# Patient Record
Sex: FEMALE | Race: WHITE | Employment: OTHER | URBAN - METROPOLITAN AREA
[De-identification: names, ages, dates, MRNs, and addresses within clinical notes are randomized per-mention and may not be internally consistent; named-entity substitution may affect disease eponyms.]

---

## 2019-02-12 ENCOUNTER — ANESTHESIA (OUTPATIENT)
Dept: SURGERY | Age: 69
End: 2019-02-12
Payer: MEDICARE

## 2019-02-12 ENCOUNTER — HOSPITAL ENCOUNTER (OUTPATIENT)
Age: 69
Setting detail: OUTPATIENT SURGERY
Discharge: HOME | End: 2019-02-12
Attending: OPHTHALMOLOGY | Admitting: OPHTHALMOLOGY
Payer: MEDICARE

## 2019-02-12 ENCOUNTER — ANESTHESIA EVENT (OUTPATIENT)
Dept: SURGERY | Age: 69
End: 2019-02-12
Payer: MEDICARE

## 2019-02-12 VITALS
RESPIRATION RATE: 12 BRPM | WEIGHT: 139.56 LBS | HEIGHT: 61 IN | BODY MASS INDEX: 26.35 KG/M2 | SYSTOLIC BLOOD PRESSURE: 150 MMHG | OXYGEN SATURATION: 99 % | HEART RATE: 75 BPM | DIASTOLIC BLOOD PRESSURE: 81 MMHG | TEMPERATURE: 97.3 F

## 2019-02-12 PROBLEM — H53.8 BLURRED VISION: Status: ACTIVE | Noted: 2019-02-12

## 2019-02-12 PROBLEM — H53.8 BLURRED VISION: Status: RESOLVED | Noted: 2019-02-12 | Resolved: 2019-02-12

## 2019-02-12 PROCEDURE — 76060000032 HC ANESTHESIA 0.5 TO 1 HR: Performed by: OPHTHALMOLOGY

## 2019-02-12 PROCEDURE — 77030013389: Performed by: OPHTHALMOLOGY

## 2019-02-12 PROCEDURE — 74011000250 HC RX REV CODE- 250: Performed by: OPHTHALMOLOGY

## 2019-02-12 PROCEDURE — 76210000021 HC REC RM PH II 0.5 TO 1 HR: Performed by: OPHTHALMOLOGY

## 2019-02-12 PROCEDURE — 76010000138 HC OR TIME 0.5 TO 1 HR: Performed by: OPHTHALMOLOGY

## 2019-02-12 PROCEDURE — V2630 ANTER CHAMBER INTRAOCUL LENS: HCPCS | Performed by: OPHTHALMOLOGY

## 2019-02-12 PROCEDURE — 74011250636 HC RX REV CODE- 250/636: Performed by: OPHTHALMOLOGY

## 2019-02-12 PROCEDURE — 74011250636 HC RX REV CODE- 250/636

## 2019-02-12 PROCEDURE — V2787 ASTIGMATISM-CORRECT FUNCTION: HCPCS | Performed by: OPHTHALMOLOGY

## 2019-02-12 PROCEDURE — 77030032490 HC SLV COMPR SCD KNE COVD -B: Performed by: OPHTHALMOLOGY

## 2019-02-12 PROCEDURE — 77030013340: Performed by: OPHTHALMOLOGY

## 2019-02-12 DEVICE — IMPLANTABLE DEVICE: Type: IMPLANTABLE DEVICE | Site: EYE | Status: FUNCTIONAL

## 2019-02-12 RX ORDER — PROPARACAINE HYDROCHLORIDE 5 MG/ML
SOLUTION/ DROPS OPHTHALMIC AS NEEDED
Status: DISCONTINUED | OUTPATIENT
Start: 2019-02-12 | End: 2019-02-12 | Stop reason: HOSPADM

## 2019-02-12 RX ORDER — SODIUM CHLORIDE, SODIUM LACTATE, POTASSIUM CHLORIDE, CALCIUM CHLORIDE 600; 310; 30; 20 MG/100ML; MG/100ML; MG/100ML; MG/100ML
75 INJECTION, SOLUTION INTRAVENOUS CONTINUOUS
Status: DISCONTINUED | OUTPATIENT
Start: 2019-02-12 | End: 2019-02-12 | Stop reason: HOSPADM

## 2019-02-12 RX ORDER — EPINEPHRINE 1 MG/ML
INJECTION, SOLUTION, CONCENTRATE INTRAVENOUS AS NEEDED
Status: DISCONTINUED | OUTPATIENT
Start: 2019-02-12 | End: 2019-02-12 | Stop reason: HOSPADM

## 2019-02-12 RX ORDER — DEXTROSE 50 % IN WATER (D50W) INTRAVENOUS SYRINGE
25-50 AS NEEDED
Status: CANCELLED | OUTPATIENT
Start: 2019-02-12

## 2019-02-12 RX ORDER — TROPICAMIDE 10 MG/ML
1 SOLUTION/ DROPS OPHTHALMIC
Status: COMPLETED | OUTPATIENT
Start: 2019-02-12 | End: 2019-02-12

## 2019-02-12 RX ORDER — OFLOXACIN 3 MG/ML
1 SOLUTION/ DROPS OPHTHALMIC ONCE
Status: COMPLETED | OUTPATIENT
Start: 2019-02-12 | End: 2019-02-12

## 2019-02-12 RX ORDER — PHENYLEPHRINE HYDROCHLORIDE 25 MG/ML
1 SOLUTION/ DROPS OPHTHALMIC ONCE
Status: COMPLETED | OUTPATIENT
Start: 2019-02-12 | End: 2019-02-12

## 2019-02-12 RX ORDER — ALBUTEROL SULFATE 0.83 MG/ML
2.5 SOLUTION RESPIRATORY (INHALATION) AS NEEDED
Status: CANCELLED | OUTPATIENT
Start: 2019-02-12

## 2019-02-12 RX ORDER — OXYCODONE HYDROCHLORIDE 5 MG/1
5 TABLET ORAL ONCE
Status: CANCELLED | OUTPATIENT
Start: 2019-02-12 | End: 2019-02-12

## 2019-02-12 RX ORDER — KETOROLAC TROMETHAMINE 5 MG/ML
1 SOLUTION OPHTHALMIC
Status: COMPLETED | OUTPATIENT
Start: 2019-02-12 | End: 2019-02-12

## 2019-02-12 RX ORDER — NALOXONE HYDROCHLORIDE 0.4 MG/ML
0.1 INJECTION, SOLUTION INTRAMUSCULAR; INTRAVENOUS; SUBCUTANEOUS AS NEEDED
Status: CANCELLED | OUTPATIENT
Start: 2019-02-12

## 2019-02-12 RX ORDER — DIPHENHYDRAMINE HYDROCHLORIDE 50 MG/ML
12.5 INJECTION, SOLUTION INTRAMUSCULAR; INTRAVENOUS
Status: CANCELLED | OUTPATIENT
Start: 2019-02-12

## 2019-02-12 RX ORDER — MIDAZOLAM HYDROCHLORIDE 1 MG/ML
INJECTION, SOLUTION INTRAMUSCULAR; INTRAVENOUS AS NEEDED
Status: DISCONTINUED | OUTPATIENT
Start: 2019-02-12 | End: 2019-02-12 | Stop reason: HOSPADM

## 2019-02-12 RX ORDER — ONDANSETRON 2 MG/ML
4 INJECTION INTRAMUSCULAR; INTRAVENOUS ONCE
Status: CANCELLED | OUTPATIENT
Start: 2019-02-12 | End: 2019-02-12

## 2019-02-12 RX ORDER — SODIUM CHLORIDE 0.9 % (FLUSH) 0.9 %
5-40 SYRINGE (ML) INJECTION AS NEEDED
Status: CANCELLED | OUTPATIENT
Start: 2019-02-12

## 2019-02-12 RX ORDER — SODIUM CHLORIDE, SODIUM LACTATE, POTASSIUM CHLORIDE, CALCIUM CHLORIDE 600; 310; 30; 20 MG/100ML; MG/100ML; MG/100ML; MG/100ML
150 INJECTION, SOLUTION INTRAVENOUS CONTINUOUS
Status: CANCELLED | OUTPATIENT
Start: 2019-02-12

## 2019-02-12 RX ORDER — MAGNESIUM SULFATE 100 %
4 CRYSTALS MISCELLANEOUS AS NEEDED
Status: CANCELLED | OUTPATIENT
Start: 2019-02-12

## 2019-02-12 RX ORDER — FENTANYL CITRATE 50 UG/ML
INJECTION, SOLUTION INTRAMUSCULAR; INTRAVENOUS AS NEEDED
Status: DISCONTINUED | OUTPATIENT
Start: 2019-02-12 | End: 2019-02-12 | Stop reason: HOSPADM

## 2019-02-12 RX ORDER — SODIUM CHLORIDE 0.9 % (FLUSH) 0.9 %
5-40 SYRINGE (ML) INJECTION EVERY 8 HOURS
Status: CANCELLED | OUTPATIENT
Start: 2019-02-12

## 2019-02-12 RX ADMIN — TROPICAMIDE 1 DROP: 10 SOLUTION/ DROPS OPHTHALMIC at 09:03

## 2019-02-12 RX ADMIN — TROPICAMIDE 1 DROP: 10 SOLUTION/ DROPS OPHTHALMIC at 09:13

## 2019-02-12 RX ADMIN — FENTANYL CITRATE 25 MCG: 50 INJECTION, SOLUTION INTRAMUSCULAR; INTRAVENOUS at 10:11

## 2019-02-12 RX ADMIN — KETOROLAC TROMETHAMINE 1 DROP: 5 SOLUTION OPHTHALMIC at 08:56

## 2019-02-12 RX ADMIN — KETOROLAC TROMETHAMINE 1 DROP: 5 SOLUTION OPHTHALMIC at 09:13

## 2019-02-12 RX ADMIN — FENTANYL CITRATE 25 MCG: 50 INJECTION, SOLUTION INTRAMUSCULAR; INTRAVENOUS at 10:15

## 2019-02-12 RX ADMIN — TROPICAMIDE 1 DROP: 10 SOLUTION/ DROPS OPHTHALMIC at 08:46

## 2019-02-12 RX ADMIN — PHENYLEPHRINE HYDROCHLORIDE 1 DROP: 2.5 SOLUTION/ DROPS OPHTHALMIC at 08:45

## 2019-02-12 RX ADMIN — MIDAZOLAM HYDROCHLORIDE 1 MG: 1 INJECTION, SOLUTION INTRAMUSCULAR; INTRAVENOUS at 10:10

## 2019-02-12 RX ADMIN — SODIUM CHLORIDE, SODIUM LACTATE, POTASSIUM CHLORIDE, AND CALCIUM CHLORIDE: 600; 310; 30; 20 INJECTION, SOLUTION INTRAVENOUS at 10:07

## 2019-02-12 RX ADMIN — TROPICAMIDE 1 DROP: 10 SOLUTION/ DROPS OPHTHALMIC at 09:18

## 2019-02-12 RX ADMIN — KETOROLAC TROMETHAMINE 1 DROP: 5 SOLUTION OPHTHALMIC at 09:03

## 2019-02-12 RX ADMIN — OFLOXACIN 1 DROP: 3 SOLUTION/ DROPS OPHTHALMIC at 09:17

## 2019-02-12 RX ADMIN — MIDAZOLAM HYDROCHLORIDE 1 MG: 1 INJECTION, SOLUTION INTRAMUSCULAR; INTRAVENOUS at 10:18

## 2019-02-12 RX ADMIN — KETOROLAC TROMETHAMINE 1 DROP: 5 SOLUTION OPHTHALMIC at 09:18

## 2019-02-12 RX ADMIN — SODIUM CHLORIDE, SODIUM LACTATE, POTASSIUM CHLORIDE, AND CALCIUM CHLORIDE 75 ML/HR: 600; 310; 30; 20 INJECTION, SOLUTION INTRAVENOUS at 09:03

## 2019-02-12 RX ADMIN — KETOROLAC TROMETHAMINE 1 DROP: 5 SOLUTION OPHTHALMIC at 08:49

## 2019-02-12 RX ADMIN — TROPICAMIDE 1 DROP: 10 SOLUTION/ DROPS OPHTHALMIC at 08:56

## 2019-02-12 RX ADMIN — FENTANYL CITRATE 50 MCG: 50 INJECTION, SOLUTION INTRAMUSCULAR; INTRAVENOUS at 10:22

## 2019-02-12 NOTE — PERIOP NOTES
PATIENT SUPPLIED POST-OP DROPS: KETOROLAC, OFLOXACIN, PREDNISOLONE 1 GTT EACH left EYE AT END OF CASE. SUNGLASSES APPLIED AT END OF CASE.

## 2019-02-12 NOTE — ANESTHESIA PREPROCEDURE EVALUATION
Anesthetic History No history of anesthetic complications Review of Systems / Medical History Patient summary reviewed, nursing notes reviewed and pertinent labs reviewed Pulmonary Within defined limits Neuro/Psych Within defined limits Cardiovascular Exercise tolerance: >4 METS 
  
GI/Hepatic/Renal 
Within defined limits Endo/Other Arthritis Other Findings Anesthetic Plan ASA: 2 Anesthesia type: MAC Anesthetic plan and risks discussed with: Patient

## 2019-02-12 NOTE — ANESTHESIA POSTPROCEDURE EVALUATION
Procedure(s): CATARACT EXTRACTION WITH INTRA OCULAR LENS IMPLANT LEFT EYE. Anesthesia Post Evaluation Multimodal analgesia: multimodal analgesia used between 6 hours prior to anesthesia start to PACU discharge Patient location during evaluation: PACU Patient participation: complete - patient participated Level of consciousness: awake Pain management: adequate Airway patency: patent Anesthetic complications: no 
Cardiovascular status: acceptable Respiratory status: acceptable Hydration status: acceptable Post anesthesia nausea and vomiting:  none Visit Vitals /69 Pulse 86 Temp 36.2 °C (97.1 °F) Resp (!) 7 Ht 5' 1\" (1.549 m) Wt 63.3 kg (139 lb 9 oz) SpO2 99% BMI 26.37 kg/m²

## 2019-02-12 NOTE — INTERVAL H&P NOTE
H&P Update: 
Constance Chandler was seen and examined. History and physical has been reviewed. The patient has been examined.  There have been no significant clinical changes since the completion of the originally dated History and Physical. 
 
Signed By: Michelle Stallworth MD   
 February 12, 2019 7:48 AM

## 2019-02-12 NOTE — DISCHARGE INSTRUCTIONS
Patient Education     DISCHARGE SUMMARY from Nurse    PATIENT INSTRUCTIONS:    After general anesthesia or intravenous sedation, for 24 hours or while taking prescription Narcotics:  · Limit your activities  · Do not drive and operate hazardous machinery  · Do not make important personal or business decisions  · Do  not drink alcoholic beverages  · If you have not urinated within 8 hours after discharge, please contact your surgeon on call. Report the following to your surgeon:  · Excessive pain, swelling, redness or odor of or around the surgical area  · Temperature over 100.5  · Nausea and vomiting lasting longer than 4 hours or if unable to take medications  · Any signs of decreased circulation or nerve impairment to extremity: change in color, persistent  numbness, tingling, coldness or increase pain  · Any questions    What to do at Home:  [place] TO OFFICE ON Wednesday AS SCHEDULED    If you experience any of the following symptoms bleeding, nausea, severe pain, please follow up with dr Lori Wells    *  Please give a list of your current medications to your Primary Care Provider. *  Please update this list whenever your medications are discontinued, doses are      changed, or new medications (including over-the-counter products) are added. *  Please carry medication information at all times in case of emergency situations. These are general instructions for a healthy lifestyle:    No smoking/ No tobacco products/ Avoid exposure to second hand smoke  Surgeon General's Warning:  Quitting smoking now greatly reduces serious risk to your health.     Obesity, smoking, and sedentary lifestyle greatly increases your risk for illness    A healthy diet, regular physical exercise & weight monitoring are important for maintaining a healthy lifestyle    You may be retaining fluid if you have a history of heart failure or if you experience any of the following symptoms:  Weight gain of 3 pounds or more overnight or 5 pounds in a week, increased swelling in our hands or feet or shortness of breath while lying flat in bed. Please call your doctor as soon as you notice any of these symptoms; do not wait until your next office visit. Recognize signs and symptoms of STROKE:    F-face looks uneven    A-arms unable to move or move unevenly    S-speech slurred or non-existent    T-time-call 911 as soon as signs and symptoms begin-DO NOT go       Back to bed or wait to see if you get better-TIME IS BRAIN. Warning Signs of HEART ATTACK     Call 911 if you have these symptoms:   Chest discomfort. Most heart attacks involve discomfort in the center of the chest that lasts more than a few minutes, or that goes away and comes back. It can feel like uncomfortable pressure, squeezing, fullness, or pain.  Discomfort in other areas of the upper body. Symptoms can include pain or discomfort in one or both arms, the back, neck, jaw, or stomach.  Shortness of breath with or without chest discomfort.  Other signs may include breaking out in a cold sweat, nausea, or lightheadedness. Don't wait more than five minutes to call 911 - MINUTES MATTER! Fast action can save your life. Calling 911 is almost always the fastest way to get lifesaving treatment. Emergency Medical Services staff can begin treatment when they arrive -- up to an hour sooner than if someone gets to the hospital by car. The discharge information has been reviewed with the patient and caregiver. The patient and caregiver verbalized understanding. Discharge medications reviewed with the patient and caregiver and appropriate educational materials and side effects teaching were provided.   ___________________________________________________________________________________________________________________________________     Cataract Surgery: What to Expect at Home  Your Recovery    After surgery, your eye will not hurt. But it may feel scratchy, sticky, or uncomfortable. It may also water more than usual.  Most people see better 1 to 3 days after surgery. But it could take 3 to 10 weeks to get the full benefits of surgery and to see as clearly as possible. Your doctor may send you home with a bandage, patch, or clear shield on your eye. This will keep you from rubbing your eye. Your doctor will also give you eyedrops to help your eye heal. Use them exactly as directed. You can read or watch TV right away, but things may look blurry. Most people are able to return to work or their normal routine in 1 to 3 days. After your eye heals, you may still need to wear glasses, especially for reading. This care sheet gives you a general idea about how long it will take for you to recover. But each person recovers at a different pace. Follow the steps below to get better as quickly as possible. How can you care for yourself at home? Activity    · Rest when you feel tired. Getting enough sleep will help you recover.     · You may have trouble judging distances for a few days. Move slowly, and be careful going up and down stairs and pouring hot liquids. Ask for help if you need it.     · Ask your doctor when it is okay to drive.     · Wear your eye bandage, patch, or shield for as long as your doctor recommends. You may only need to wear it when you sleep.     · You can shower or wash your hair the day after surgery. Keep water, soap, shampoo, hair spray, and shaving lotion out of your eye, especially for the first week.     · Do not rub or put pressure on your eye for at least 1 week.     · Do not wear eye makeup for 1 to 2 weeks.  You may also want to avoid face cream or lotion.     · Do not get your hair colored or permed for 10 days after surgery.     · Do not bend over or do any strenuous activities, such as biking, jogging, weight lifting, or aerobic exercise, for 2 weeks or until your doctor says it is okay.     · Avoid swimming, hot tubs, gardening, and dusting for 1 to 2 weeks.     · Wear sunglasses on bright days for at least 1 year after surgery. Medicines    · Your doctor will tell you if and when you can restart your medicines. He or she will also give you instructions about taking any new medicines.     · If you take blood thinners, such as warfarin (Coumadin), clopidogrel (Plavix), or aspirin, be sure to talk to your doctor. He or she will tell you if and when to start taking those medicines again. Make sure that you understand exactly what your doctor wants you to do.     · Follow your doctor's instructions for when to use your eyedrops. Always wash your hands before you put your drops in. To put in eyedrops:  ? Tilt your head back, and pull your lower eyelid down with one finger. ? Drop or squirt the medicine inside the lower lid. ? Close your eye for 30 to 60 seconds to let the drops or ointment move around. ? Do not touch the ointment or dropper tip to your eyelashes or any other surface.     · Follow your doctor's instructions for taking pain medicines. Follow-up care is a key part of your treatment and safety. Be sure to make and go to all appointments, and call your doctor if you are having problems. It's also a good idea to know your test results and keep a list of the medicines you take. When should you call for help? Call 911 anytime you think you may need emergency care. For example, call if:    · You passed out (lost consciousness).     · You have a sudden loss of vision.     · You have sudden chest pain, are short of breath, or cough up blood.    Call your doctor now or seek immediate medical care if:    · You have signs of an eye infection, such as:  ? Pus or thick discharge coming from the eye.  ? Redness or swelling around the eye.  ?  A fever.     · You have new or worse eye pain.     · You have vision changes.     · You have symptoms of a blood clot in your leg (called a deep vein thrombosis), such as:  ? Pain in the calf, back of the knee, thigh, or groin. ? Redness and swelling in your leg or groin.    Watch closely for changes in your health, and be sure to contact your doctor if:    · You do not get better as expected. Where can you learn more? Go to http://melvin-marcio.info/. Enter R255 in the search box to learn more about \"Cataract Surgery: What to Expect at Home. \"  Current as of: July 17, 2018  Content Version: 11.9  © 8759-7340 [employer]. Care instructions adapted under license by [employer] (which disclaims liability or warranty for this information). If you have questions about a medical condition or this instruction, always ask your healthcare professional. [address] any warranty or liability for your use of this information.        Patient armband removed and shredded

## 2019-02-12 NOTE — OP NOTES
Cataract Operative Note      Patient: Juan Diego Nix               Sex: female          DOA: 2/12/2019         YOB: 1950      Age:  79 y.o.        LOS:  LOS: 0 days     Preoperative Diagnosis: Cataract left eye    Postoperative Diagnosis:  Cataract  left eye  Surgeon: Lul Guo. Myles Fernandez M.D. Anesthesia:  Topical anesthesia  Procedure:  Phacoemulsification of posterior chamber for intraocular lens implantation left    Fluids:  0    Procedure in Detail: The operative eye was prepped and draped in the usual fashion. A lid speculum was placed in the operative eye. A clear cornea approach was utilized. A paracentesis incision(s) was constructed with a 1 mm slit knife. One percent preservative-free lidocaine followed by viscoelastic was instilled into the anterior chamber. A clear corneal incision was made with a slit knife. A continuous curvilinear capsulorrhexis was constructed followed by hydrodissection. A phacoemulsification tip was placed into the eye, and the lens nucleus was emulsified. The irrigation/aspiration device was then used to remove any remaining cortical material.  Polishing of the capsule was performed as needed. The intraocular lens was then placed into the capsular bag after it was re-inflated with viscoelastic. The remaining viscoelastic was then removed using the irrigation/aspiration device. BSS on a cannula was then used to hydrate the wound edges. At the end of the procedure the wound was found to be watertight, the anterior chamber was deep and the pupil round. No blood loss during surgery. An antibiotic and anti-inflammatroy was placed into the operative eye. The lid speculum was removed. Protective sunglasses were then placed onto the patient. The patient was taken to the [address] [place] [address] Unit (PACU) in good condition having tolerated the procedure well. Estimated Blood Loss: 0                 Implants:   Implant Name Type Inv.  Item Serial No.  Lot No. LRB No. Used Action   LENS ACRYSF IQ TOR3 ASTGM 15.0 -- A[employer]YSOF - [id number] Intraocular Lens LENS ACRYSF IQ TOR3 ASTGM 15.0 -- [employer]YSOF [id number] [employer]  Left 1 Implanted       Specimens: * No specimens in log *            Complications:  None           Lul Guo.  TANG Brown.Chan  10:37 AM

## 2019-04-08 ENCOUNTER — ANESTHESIA EVENT (OUTPATIENT)
Dept: SURGERY | Age: 69
End: 2019-04-08
Payer: MEDICARE

## 2019-04-09 ENCOUNTER — HOSPITAL ENCOUNTER (OUTPATIENT)
Age: 69
Setting detail: OUTPATIENT SURGERY
Discharge: HOME | End: 2019-04-09
Attending: OPHTHALMOLOGY | Admitting: OPHTHALMOLOGY
Payer: MEDICARE

## 2019-04-09 ENCOUNTER — ANESTHESIA (OUTPATIENT)
Dept: SURGERY | Age: 69
End: 2019-04-09
Payer: MEDICARE

## 2019-04-09 VITALS
OXYGEN SATURATION: 98 % | BODY MASS INDEX: 26.34 KG/M2 | WEIGHT: 139.5 LBS | DIASTOLIC BLOOD PRESSURE: 63 MMHG | HEART RATE: 74 BPM | HEIGHT: 61 IN | SYSTOLIC BLOOD PRESSURE: 133 MMHG | TEMPERATURE: 97.6 F | RESPIRATION RATE: 16 BRPM

## 2019-04-09 PROBLEM — H53.8 BLURRED VISION: Status: RESOLVED | Noted: 2019-04-09 | Resolved: 2019-04-09

## 2019-04-09 PROBLEM — H53.8 BLURRED VISION: Status: ACTIVE | Noted: 2019-04-09

## 2019-04-09 PROCEDURE — 74011000250 HC RX REV CODE- 250: Performed by: OPHTHALMOLOGY

## 2019-04-09 PROCEDURE — 76010000138 HC OR TIME 0.5 TO 1 HR: Performed by: OPHTHALMOLOGY

## 2019-04-09 PROCEDURE — 76210000020 HC REC RM PH II FIRST 0.5 HR: Performed by: OPHTHALMOLOGY

## 2019-04-09 PROCEDURE — 77030032490 HC SLV COMPR SCD KNE COVD -B: Performed by: OPHTHALMOLOGY

## 2019-04-09 PROCEDURE — V2630 ANTER CHAMBER INTRAOCUL LENS: HCPCS | Performed by: OPHTHALMOLOGY

## 2019-04-09 PROCEDURE — 74011250636 HC RX REV CODE- 250/636

## 2019-04-09 PROCEDURE — V2787 ASTIGMATISM-CORRECT FUNCTION: HCPCS | Performed by: OPHTHALMOLOGY

## 2019-04-09 PROCEDURE — 77030013389: Performed by: OPHTHALMOLOGY

## 2019-04-09 PROCEDURE — 74011250636 HC RX REV CODE- 250/636: Performed by: OPHTHALMOLOGY

## 2019-04-09 PROCEDURE — 76060000032 HC ANESTHESIA 0.5 TO 1 HR: Performed by: OPHTHALMOLOGY

## 2019-04-09 PROCEDURE — 77030013340: Performed by: OPHTHALMOLOGY

## 2019-04-09 DEVICE — IMPLANTABLE DEVICE: Type: IMPLANTABLE DEVICE | Site: EYE | Status: FUNCTIONAL

## 2019-04-09 RX ORDER — EPINEPHRINE 1 MG/ML
INJECTION, SOLUTION, CONCENTRATE INTRAVENOUS AS NEEDED
Status: DISCONTINUED | OUTPATIENT
Start: 2019-04-09 | End: 2019-04-09 | Stop reason: HOSPADM

## 2019-04-09 RX ORDER — OFLOXACIN 3 MG/ML
1 SOLUTION/ DROPS OPHTHALMIC
Status: DISCONTINUED | OUTPATIENT
Start: 2019-04-09 | End: 2019-04-09 | Stop reason: HOSPADM

## 2019-04-09 RX ORDER — TROPICAMIDE 10 MG/ML
1 SOLUTION/ DROPS OPHTHALMIC
Status: COMPLETED | OUTPATIENT
Start: 2019-04-09 | End: 2019-04-09

## 2019-04-09 RX ORDER — KETOROLAC TROMETHAMINE 5 MG/ML
1 SOLUTION OPHTHALMIC
Status: COMPLETED | OUTPATIENT
Start: 2019-04-09 | End: 2019-04-09

## 2019-04-09 RX ORDER — SODIUM CHLORIDE, SODIUM LACTATE, POTASSIUM CHLORIDE, CALCIUM CHLORIDE 600; 310; 30; 20 MG/100ML; MG/100ML; MG/100ML; MG/100ML
75 INJECTION, SOLUTION INTRAVENOUS CONTINUOUS
Status: DISCONTINUED | OUTPATIENT
Start: 2019-04-09 | End: 2019-04-09 | Stop reason: HOSPADM

## 2019-04-09 RX ORDER — ONDANSETRON 2 MG/ML
INJECTION INTRAMUSCULAR; INTRAVENOUS AS NEEDED
Status: DISCONTINUED | OUTPATIENT
Start: 2019-04-09 | End: 2019-04-09 | Stop reason: HOSPADM

## 2019-04-09 RX ORDER — PHENYLEPHRINE HYDROCHLORIDE 25 MG/ML
1 SOLUTION/ DROPS OPHTHALMIC
Status: COMPLETED | OUTPATIENT
Start: 2019-04-09 | End: 2019-04-09

## 2019-04-09 RX ORDER — MIDAZOLAM HYDROCHLORIDE 1 MG/ML
INJECTION, SOLUTION INTRAMUSCULAR; INTRAVENOUS AS NEEDED
Status: DISCONTINUED | OUTPATIENT
Start: 2019-04-09 | End: 2019-04-09 | Stop reason: HOSPADM

## 2019-04-09 RX ORDER — PROPARACAINE HYDROCHLORIDE 5 MG/ML
SOLUTION/ DROPS OPHTHALMIC AS NEEDED
Status: DISCONTINUED | OUTPATIENT
Start: 2019-04-09 | End: 2019-04-09 | Stop reason: HOSPADM

## 2019-04-09 RX ORDER — PROPARACAINE HYDROCHLORIDE 5 MG/ML
1 SOLUTION/ DROPS OPHTHALMIC ONCE
Status: DISCONTINUED | OUTPATIENT
Start: 2019-04-09 | End: 2019-04-09 | Stop reason: HOSPADM

## 2019-04-09 RX ORDER — FENTANYL CITRATE 50 UG/ML
INJECTION, SOLUTION INTRAMUSCULAR; INTRAVENOUS AS NEEDED
Status: DISCONTINUED | OUTPATIENT
Start: 2019-04-09 | End: 2019-04-09 | Stop reason: HOSPADM

## 2019-04-09 RX ADMIN — PHENYLEPHRINE HYDROCHLORIDE 1 DROP: 2.5 SOLUTION/ DROPS OPHTHALMIC at 07:16

## 2019-04-09 RX ADMIN — TROPICAMIDE 1 DROP: 10 SOLUTION/ DROPS OPHTHALMIC at 07:38

## 2019-04-09 RX ADMIN — FENTANYL CITRATE 50 MCG: 50 INJECTION, SOLUTION INTRAMUSCULAR; INTRAVENOUS at 07:54

## 2019-04-09 RX ADMIN — ONDANSETRON 4 MG: 2 INJECTION INTRAMUSCULAR; INTRAVENOUS at 08:01

## 2019-04-09 RX ADMIN — KETOROLAC TROMETHAMINE 1 DROP: 5 SOLUTION OPHTHALMIC at 07:33

## 2019-04-09 RX ADMIN — TROPICAMIDE 1 DROP: 10 SOLUTION/ DROPS OPHTHALMIC at 07:33

## 2019-04-09 RX ADMIN — PHENYLEPHRINE HYDROCHLORIDE 1 DROP: 2.5 SOLUTION/ DROPS OPHTHALMIC at 07:27

## 2019-04-09 RX ADMIN — PHENYLEPHRINE HYDROCHLORIDE 1 DROP: 2.5 SOLUTION/ DROPS OPHTHALMIC at 07:22

## 2019-04-09 RX ADMIN — KETOROLAC TROMETHAMINE 1 DROP: 5 SOLUTION OPHTHALMIC at 07:22

## 2019-04-09 RX ADMIN — SODIUM CHLORIDE, SODIUM LACTATE, POTASSIUM CHLORIDE, AND CALCIUM CHLORIDE 75 ML/HR: 600; 310; 30; 20 INJECTION, SOLUTION INTRAVENOUS at 07:21

## 2019-04-09 RX ADMIN — KETOROLAC TROMETHAMINE 1 DROP: 5 SOLUTION OPHTHALMIC at 07:27

## 2019-04-09 RX ADMIN — PHENYLEPHRINE HYDROCHLORIDE 1 DROP: 2.5 SOLUTION/ DROPS OPHTHALMIC at 07:32

## 2019-04-09 RX ADMIN — KETOROLAC TROMETHAMINE 1 DROP: 5 SOLUTION OPHTHALMIC at 07:17

## 2019-04-09 RX ADMIN — OFLOXACIN 1 DROP: 3 SOLUTION/ DROPS OPHTHALMIC at 07:16

## 2019-04-09 RX ADMIN — PHENYLEPHRINE HYDROCHLORIDE 1 DROP: 2.5 SOLUTION/ DROPS OPHTHALMIC at 07:37

## 2019-04-09 RX ADMIN — TROPICAMIDE 1 DROP: 10 SOLUTION/ DROPS OPHTHALMIC at 07:16

## 2019-04-09 RX ADMIN — MIDAZOLAM HYDROCHLORIDE 2 MG: 1 INJECTION, SOLUTION INTRAMUSCULAR; INTRAVENOUS at 07:48

## 2019-04-09 RX ADMIN — KETOROLAC TROMETHAMINE 1 DROP: 5 SOLUTION OPHTHALMIC at 07:38

## 2019-04-09 RX ADMIN — FENTANYL CITRATE 50 MCG: 50 INJECTION, SOLUTION INTRAMUSCULAR; INTRAVENOUS at 07:52

## 2019-04-09 RX ADMIN — TROPICAMIDE 1 DROP: 10 SOLUTION/ DROPS OPHTHALMIC at 07:27

## 2019-04-09 RX ADMIN — TROPICAMIDE 1 DROP: 10 SOLUTION/ DROPS OPHTHALMIC at 07:22

## 2019-04-09 NOTE — ANESTHESIA PREPROCEDURE EVALUATION
Relevant Problems No relevant active problems Anesthetic History No history of anesthetic complications Review of Systems / Medical History Patient summary reviewed, nursing notes reviewed and pertinent labs reviewed Pulmonary Within defined limits Neuro/Psych Within defined limits Cardiovascular Within defined limits Exercise tolerance: >4 METS 
  
GI/Hepatic/Renal 
Within defined limits Endo/Other Arthritis Other Findings Physical Exam 
 
Airway Mallampati: III 
TM Distance: 4 - 6 cm Neck ROM: normal range of motion Mouth opening: Normal 
 
 Cardiovascular Dental 
No notable dental hx Pulmonary Abdominal 
 
 
 
 Other Findings Anesthetic Plan ASA: 1 Anesthesia type: MAC Anesthetic plan and risks discussed with: Patient

## 2019-04-09 NOTE — PERIOP NOTES
Reviewed PTA medication list with patient/caregiver and patient/caregiver denies any additional medications. Patient admits to having a responsible adult care for them for at least 24 hours after surgery. 
  
Dual skin assessment completed by Skye Birmingham RN and Alec Ang RN.

## 2019-04-09 NOTE — INTERVAL H&P NOTE
H&P Update: 
Mario Hardy was seen and examined. History and physical has been reviewed. The patient has been examined.  There have been no significant clinical changes since the completion of the originally dated History and Physical. 
 
Signed By: Adrienne Munroe MD   
 April 9, 2019 7:33 AM

## 2019-04-09 NOTE — DISCHARGE INSTRUCTIONS
Patient Education     DISCHARGE SUMMARY from Nurse    PATIENT INSTRUCTIONS:    After general anesthesia or intravenous sedation, for 24 hours or while taking prescription Narcotics:  · Limit your activities  · Do not drive and operate hazardous machinery  · Do not make important personal or business decisions  · Do  not drink alcoholic beverages  · If you have not urinated within 8 hours after discharge, please contact your surgeon on call. Report the following to your surgeon:  · Excessive pain, swelling, redness or odor of or around the surgical area  · Temperature over 100.5  · Nausea and vomiting lasting longer than 4 hours or if unable to take medications  · Any signs of decreased circulation or nerve impairment to extremity: change in color, persistent  numbness, tingling, coldness or increase pain  · Any questions    What to do at Home:  [place] TO OFFICE ON Wednesday AS SCHEDULED    If you experience any of the following symptoms bleeding, nausea, severe pain, please follow up with dr Han Jeter    *  Please give a list of your current medications to your Primary Care Provider. *  Please update this list whenever your medications are discontinued, doses are      changed, or new medications (including over-the-counter products) are added. *  Please carry medication information at all times in case of emergency situations. These are general instructions for a healthy lifestyle:    No smoking/ No tobacco products/ Avoid exposure to second hand smoke  Surgeon General's Warning:  Quitting smoking now greatly reduces serious risk to your health.     Obesity, smoking, and sedentary lifestyle greatly increases your risk for illness    A healthy diet, regular physical exercise & weight monitoring are important for maintaining a healthy lifestyle    You may be retaining fluid if you have a history of heart failure or if you experience any of the following symptoms:  Weight gain of 3 pounds or more overnight or 5 pounds in a week, increased swelling in our hands or feet or shortness of breath while lying flat in bed. Please call your doctor as soon as you notice any of these symptoms; do not wait until your next office visit. Recognize signs and symptoms of STROKE:    F-face looks uneven    A-arms unable to move or move unevenly    S-speech slurred or non-existent    T-time-call 911 as soon as signs and symptoms begin-DO NOT go       Back to bed or wait to see if you get better-TIME IS BRAIN. Warning Signs of HEART ATTACK     Call 911 if you have these symptoms:   Chest discomfort. Most heart attacks involve discomfort in the center of the chest that lasts more than a few minutes, or that goes away and comes back. It can feel like uncomfortable pressure, squeezing, fullness, or pain.  Discomfort in other areas of the upper body. Symptoms can include pain or discomfort in one or both arms, the back, neck, jaw, or stomach.  Shortness of breath with or without chest discomfort.  Other signs may include breaking out in a cold sweat, nausea, or lightheadedness. Don't wait more than five minutes to call 911 - MINUTES MATTER! Fast action can save your life. Calling 911 is almost always the fastest way to get lifesaving treatment. Emergency Medical Services staff can begin treatment when they arrive -- up to an hour sooner than if someone gets to the hospital by car. The discharge information has been reviewed with the patient and caregiver. The patient and caregiver verbalized understanding. Discharge medications reviewed with the patient and caregiver and appropriate educational materials and side effects teaching were provided.   ___________________________________________________________________________________________________________________________________     Cataract Surgery: What to Expect at Home  Your Recovery    After surgery, your eye will not hurt. But it may feel scratchy, sticky, or uncomfortable. It may also water more than usual.  Most people see better 1 to 3 days after surgery. But it could take 3 to 10 weeks to get the full benefits of surgery and to see as clearly as possible. Your doctor may send you home with a bandage, patch, or clear shield on your eye. This will keep you from rubbing your eye. Your doctor will also give you eyedrops to help your eye heal. Use them exactly as directed. You can read or watch TV right away, but things may look blurry. Most people are able to return to work or their normal routine in 1 to 3 days. After your eye heals, you may still need to wear glasses, especially for reading. This care sheet gives you a general idea about how long it will take for you to recover. But each person recovers at a different pace. Follow the steps below to get better as quickly as possible. How can you care for yourself at home? Activity    · Rest when you feel tired. Getting enough sleep will help you recover.     · You may have trouble judging distances for a few days. Move slowly, and be careful going up and down stairs and pouring hot liquids. Ask for help if you need it.     · Ask your doctor when it is okay to drive.     · Wear your eye bandage, patch, or shield for as long as your doctor recommends. You may only need to wear it when you sleep.     · You can shower or wash your hair the day after surgery. Keep water, soap, shampoo, hair spray, and shaving lotion out of your eye, especially for the first week.     · Do not rub or put pressure on your eye for at least 1 week.     · Do not wear eye makeup for 1 to 2 weeks.  You may also want to avoid face cream or lotion.     · Do not get your hair colored or permed for 10 days after surgery.     · Do not bend over or do any strenuous activities, such as biking, jogging, weight lifting, or aerobic exercise, for 2 weeks or until your doctor says it is okay.     · Avoid swimming, hot tubs, gardening, and dusting for 1 to 2 weeks.     · Wear sunglasses on bright days for at least 1 year after surgery. Medicines    · Your doctor will tell you if and when you can restart your medicines. He or she will also give you instructions about taking any new medicines.     · If you take blood thinners, such as warfarin (Coumadin), clopidogrel (Plavix), or aspirin, be sure to talk to your doctor. He or she will tell you if and when to start taking those medicines again. Make sure that you understand exactly what your doctor wants you to do.     · Follow your doctor's instructions for when to use your eyedrops. Always wash your hands before you put your drops in. To put in eyedrops:  ? Tilt your head back, and pull your lower eyelid down with one finger. ? Drop or squirt the medicine inside the lower lid. ? Close your eye for 30 to 60 seconds to let the drops or ointment move around. ? Do not touch the ointment or dropper tip to your eyelashes or any other surface.     · Follow your doctor's instructions for taking pain medicines. Follow-up care is a key part of your treatment and safety. Be sure to make and go to all appointments, and call your doctor if you are having problems. It's also a good idea to know your test results and keep a list of the medicines you take. When should you call for help? Call 911 anytime you think you may need emergency care. For example, call if:    · You passed out (lost consciousness).     · You have a sudden loss of vision.     · You have sudden chest pain, are short of breath, or cough up blood.    Call your doctor now or seek immediate medical care if:    · You have signs of an eye infection, such as:  ? Pus or thick discharge coming from the eye.  ? Redness or swelling around the eye.  ?  A fever.     · You have new or worse eye pain.     · You have vision changes.     · You have symptoms of a blood clot in your leg (called a deep vein thrombosis), such as:  ? Pain in the calf, back of the knee, thigh, or groin. ? Redness and swelling in your leg or groin.    Watch closely for changes in your health, and be sure to contact your doctor if:    · You do not get better as expected. Where can you learn more? Go to http://melvin-marcio.info/. Enter R255 in the search box to learn more about \"Cataract Surgery: What to Expect at Home. \"  Current as of: July 17, 2018  Content Version: 11.9  © 2928-4992 [employer]. Care instructions adapted under license by [employer] (which disclaims liability or warranty for this information). If you have questions about a medical condition or this instruction, always ask your healthcare professional. [address] any warranty or liability for your use of this information.     Patient armband removed and shredded

## 2019-04-09 NOTE — OP NOTES
Cataract Operative Note      Patient: Em Vinson               Sex: female          DOA: 4/9/2019         YOB: 1950      Age:  79 y.o.        LOS:  LOS: 0 days     Preoperative Diagnosis: Cataract right eye    Postoperative Diagnosis:  Cataract  right eye  Surgeon: Rose Mena M.D. Anesthesia:  Topical anesthesia  Procedure:  Phacoemulsification of posterior chamber for intraocular lens implantation right    Fluids:  0    Procedure in Detail: The operative eye was prepped and draped in the usual fashion. A lid speculum was placed in the operative eye. A clear cornea approach was utilized. A paracentesis incision(s) was constructed with a 1 mm slit knife. One percent preservative-free lidocaine followed by viscoelastic was instilled into the anterior chamber. A clear corneal incision was made with a slit knife. A continuous curvilinear capsulorrhexis was constructed followed by hydrodissection. A phacoemulsification tip was placed into the eye, and the lens nucleus was emulsified. The irrigation/aspiration device was then used to remove any remaining cortical material.  Polishing of the capsule was performed as needed. The intraocular lens was then placed into the capsular bag after it was re-inflated with viscoelastic. The remaining viscoelastic was then removed using the irrigation/aspiration device. BSS on a cannula was then used to hydrate the wound edges. At the end of the procedure the wound was found to be watertight, the anterior chamber was deep and the pupil round. No blood loss during surgery. An antibiotic and anti-inflammatroy was placed into the operative eye. The lid speculum was removed. Protective sunglasses were then placed onto the patient. The patient was taken to the [address] [place] [address] Unit (PACU) in good condition having tolerated the procedure well. Estimated Blood Loss: 0                 Implants:   Implant Name Type Inv.  Item Serial No.  Lot No. LRB No. Used Action   LENS ACRYSF IQ TOR3 ASTGM 16.0 -- ACRYSOF - [id number] Intraocular Lens LENS ACRYSF IQ TOR3 ASTGM 16.0 -- ACRYSOF [id number] [employer]  Right 1 Implanted       Specimens: * No specimens in log *            Complications:  None           Enma Huerta.  TANG Millan.Betsy  8:29 AM

## 2019-04-09 NOTE — ANESTHESIA POSTPROCEDURE EVALUATION
Post-Anesthesia Evaluation and Assessment Cardiovascular Function/Vital Signs Visit Vitals /70 (BP 1 Location: Right arm, BP Patient Position: At rest) Pulse 77 Temp 36.4 °C (97.6 °F) Resp 16 Ht 5' 1\" (1.549 m) Wt 63.3 kg (139 lb 8 oz) SpO2 96% BMI 26.36 kg/m² Patient is status post Procedure(s): CATARACT EXTRACTION WITH INTRA OCULAR LENS IMPLANT- RIGHT EYE. Nausea/Vomiting: Controlled. Postoperative hydration reviewed and adequate. Pain: 
Pain Scale 1: Numeric (0 - 10) (04/09/19 0836) Pain Intensity 1: 0 (04/09/19 0836) Managed. Neurological Status:  
Neuro (WDL): Within Defined Limits (04/09/19 0836) At baseline. Mental Status and Level of Consciousness: Arousable. Pulmonary Status:  
O2 Device: Room air (04/09/19 0836) Adequate oxygenation and airway patent. Complications related to anesthesia: None Post-anesthesia assessment completed. No concerns. Patient has met all discharge requirements. Signed By: Renzo Xiong CRNA April 9, 2019

## 2020-01-13 ENCOUNTER — HOSPITAL ENCOUNTER (OUTPATIENT)
Dept: PHYSICAL THERAPY | Age: 70
Discharge: HOME | End: 2020-01-13
Payer: MEDICARE

## 2020-01-13 PROCEDURE — 97161 PT EVAL LOW COMPLEX 20 MIN: CPT

## 2020-01-13 PROCEDURE — 97110 THERAPEUTIC EXERCISES: CPT

## 2020-01-13 NOTE — PROGRESS NOTES
[facility] at [address]: [phone]   Fax: [phone]  PLAN OF CARE / [address] [place] [address] FOR PHYSICAL THERAPY SERVICES  Patient Name: Heidi Sánchez : 1950   Medical   Diagnosis: Left hip pain [M25.552]  Low back pain [M54.5] Treatment Diagnosis: Left Hip Pain  Low Back Pain   Onset Date: Chronic     Referral Source: Omari Swain MD [facility] of [facility]): 2020   Prior Hospitalization: See medical history Provider #: [id number]   Prior Level of Function: Gardening, Aquatic Aerobics   Comorbidities: OA   Medications: Verified on Patient Summary List   The Plan of Care and following information is based on the information from the initial evaluation.   ===========================================================================================  Assessment / giron information:  Heidi Sánchez is a 81 y.o. female presents with c/o left hip pain, low back pain, and left knee pain and stiffness for the last 2 years with no known mechanism of injury. She has decreased bilateral lower extremity weakness left more than right. She has reduced activity tolerance performing only 10 sit to stands in 30 seconds. She has decreased balance. She has decreased hip mobility right more than left. Patient ambulated with independence demonstrating an antalgic gait pattern as she had decreased stance time on the left and decreased step length on the right. Patient presentation is consistent with non specific left hip and low back pain. Patient will benefit from skilled PT services to modify and progress therapeutic interventions, address functional mobility deficits, address ROM deficits, address strength deficits, analyze and address soft tissue restrictions, analyze and cue movement patterns, analyze and modify body mechanics/ergonomics and assess and modify postural abnormalities to attain her goals. \  ===========================================================================================  Eval Complexity: History MEDIUM  Complexity : 1-2 comorbidities / personal factors will impact the outcome/ POC ;  Examination  LOW Complexity : 1-2 Standardized tests and measures addressing body structure, function, activity limitation and / or participation in recreation ; Presentation LOW Complexity : Stable, uncomplicated ;  Decision Making MEDIUM Complexity : FOTO score of 26-74; Overall Complexity LOW   Problem List: pain affecting function, decrease ROM, decrease strength, impaired gait/ balance, decrease ADL/ functional abilitiies, decrease activity tolerance, decrease flexibility/ joint mobility and decrease transfer abilities   Treatment Plan may include any combination of the following: Therapeutic exercise, Therapeutic activities, Neuromuscular re-education, Physical agent/modality, Gait/balance training, Manual therapy, Patient education, Self Care training, Functional mobility training, Home safety training and Stair training  Patient / Family readiness to learn indicated by: asking questions, trying to perform skills and interest  Persons(s) to be included in education: patient (P)  Barriers to Learning/Limitations: no  Measures taken if barriers to learning:   Patient Goal (s): Coping, stretching, managing or lessening pain   Patient self reported health status: excellent  Rehabilitation Potential: good  Goals:  Short Term Goals: To be accomplished in 4 weeks:   Patient will report compliance with initial HEP 1x/day to aid in rehabilitation program.   Status at IE: Provided initial HEP   Current: Same as IE    Long Term Goals: To be accomplished in 8 weeks:   Patient will increase B LE strength to 5/5 MMT throughout to aid in completion of ADLs. Status at IE:4-/5   Current: Same as IE     Patient will report pain no greater than 1-2/10 throughout entire day to aid in completion of ADLs.    Status at IE:1-5/10   Current:Same as IE     Patent will be able to sit/stand 16 reps in 30 seconds to be able to perform ADLs and recreational activities. Status at IE:10    Current: Same as IE     Patient will improve FOTO score to 49 points to demonstrate improvement in functional status. Status at IE:FOTO score = 33 (an established functional score where 100 = no disability). Current: Same as IE      Frequency / Duration:   Patient to be seen 2  times per week for 12  weeks:  Patient / Caregiver education and instruction: self care and exercises    . Therapist Signature: Jama Almonte PT, DPT Date: 5/00/1979   Certification Period: 1/13/2020 - 4/12/2020 Time: 2:48 PM   ===========================================================================================  I certify that the above Physical Therapy Services are being furnished while the patient is under my care. I agree with the treatment plan and certify that this therapy is necessary. Physician Signature:        Date:       Time:     Please sign and return to [facility] or you may fax the signed copy to [phone]. Thank you.

## 2020-01-13 NOTE — PROGRESS NOTES
PT DAILY TREATMENT NOTE/HIP [id number]    Patient Name: Marine Dodson  Date:2020  : 1950  [x]  Patient  Verified  Payor: [place] MEDICARE / Plan: [place] MEDICARE PART A & B / Product Type: Medicare /    In time:100  Out time:200  Total Treatment Time (min): 60  Visit #: 1 of 24    Medicare/BCBS Only   Total Timed Codes (min):  35 1:1 Treatment Time:  60     Treatment Area: Left hip pain [M25.552]    SUBJECTIVE  Pain Level (0-10 scale): 1  []constant [x]intermittent []improving []worsening []no change since onset    Any medication changes, allergies to medications, adverse drug reactions, diagnosis change, or new procedure performed?: [x] No    [] Yes (see summary sheet for update)  Subjective functional status/changes:     Patient presents with c/o left hip pain, low back pain, and left knee pain and stiffness for the last 2 years with no known mechanism of injury. Patient describes pain as ache, stiffness, pinch. Pain is worse in PM. Denies numbness/tingling. Denies popping/clicking. Aggravating factors:Bending, stopping, kneeling. Alleviating factors: new firm mattress. Denies red flags: SOB, chest pain, dizziness/lightheadedness, blurred/double vision, HA, chills/fevers, night sweats, change in bowel/bladder control, abdominal pain, difficulty swallowing, slurred speech, unexplained weight gain/loss, nausea, vomiting. PMHx: OA,. Surgical Hx: Tubal ligation, cataract surgery, Breast biopsy. Social Hx: Lives with spouse in a two story home with 2nd story bedroom, work status. PLOF: Gardening, Water Aerobics. CLOF: Limited secondary to pain.   Diagnostic Imaging: Xray: OA of the Hip and Low Back      OBJECTIVE/EXAMINATION    35 min [x]Eval                  []Re-Eval       25 min Therapeutic Exercise:  [x] See flow sheet :   Rationale: increase ROM, increase strength and decrease pain to improve the patients ability to complete ADLs       With   [] TE   [] TA   [] neuro   [] other: Patient Education: [x] Review HEP    [] Progressed/Changed HEP based on:   [] positioning   [] body mechanics   [] transfers   [] heat/ice application    [] other:        Physical Therapy Evaluation- Hip    Posture: Normal    Gait:  [] Normal    [] Abnormal    [x] Antalgic    [] NWB    Device:None    Describe: Patient ambulated with independence demonstrating an antalgic gait pattern as she had decreased stance time on the left and decreased step length on the right. ROM/Strength        AROM               Strength (1-5)  Hip Left Right Left Right   Flexion   4- 4+   Extension   3+ 4-   Abduction   4- 4   Adduction   4+ 4+   ER 35 25     IR 30 10     Knee Left Right Left Right   Extension   4 4+   Flexion   4- 4+                                     Palpation  [] Min  [] Mod  [x] Severe    Location: Left piriformis      Optional Tests  Gil/ Rudi Test: [x] Neg    [] Pos  Tay Test:  [x] Neg    [] Pos  Scouring Test: [x] Neg    [] Pos    Other tests/ comments:  Lumbar ROM:  WNLs, Some discomfort with ext. No change in symptoms with repeated motion testing     SLS: requires minimal UEA     30 second sit to stand: 10     Pain Level (0-10 scale) post treatment: 1    ASSESSMENT/Changes in Function:     Patient presents with c/o left hip pain, low back pain, and left knee pain and stiffness for the last 2 years with no known mechanism of injury. She has decreased bilateral lower extremity weakness left more than right. She has reduced activity tolerance performing only 10 sit to stands in 30 seconds. She has decreased balance. She has decreased hip mobility right more than left. Patient ambulated with independence demonstrating an antalgic gait pattern as she had decreased stance time on the left and decreased step length on the right. Patient presentation is consistent with non specific left hip and low back pain.  Patient will benefit from skilled PT services to modify and progress therapeutic interventions, address functional mobility deficits, address ROM deficits, address strength deficits, analyze and address soft tissue restrictions, analyze and cue movement patterns, analyze and modify body mechanics/ergonomics and assess and modify postural abnormalities to attain her goals.      [x]  See Plan of Care  []  See progress note/recertification  []  See Discharge Summary         Progress towards goals / Updated goals:  See POC    PLAN  []  Upgrade activities as tolerated     [x]  Continue plan of care  []  Update interventions per flow sheet       []  Discharge due to:_  []  Other:_      Stephany Alexandre, PT, DPT 1/13/2020  1:06 PM

## 2020-01-16 ENCOUNTER — HOSPITAL ENCOUNTER (OUTPATIENT)
Dept: PHYSICAL THERAPY | Age: 70
Discharge: HOME | End: 2020-01-16
Payer: MEDICARE

## 2020-01-16 PROCEDURE — 97110 THERAPEUTIC EXERCISES: CPT

## 2020-01-16 NOTE — PROGRESS NOTES
PT DAILY TREATMENT NOTE - [facility]     Patient Name: Susanne Monk  Date:2020  : 1950  [x]  Patient  Verified  Payor: Samantha Stroud / Plan: [place] MEDICARE PART A & B / Product Type: Medicare /    In time:830  Out time:905  Total Treatment Time (min): 35  Total Timed Codes (min): 35   1:1 Treatment Time ( [address] only): 30   Visit #: 2 of 24    Treatment Area: Left hip pain [M25.552]  Low back pain [M54.5]    SUBJECTIVE  Pain Level (0-10 scale): 1  Any medication changes, allergies to medications, adverse drug reactions, diagnosis change, or new procedure performed?: [x] No    [] Yes (see summary sheet for update)  Subjective functional status/changes:   [] No changes reported    Patient reports no new changes since initial evaluation. OBJECTIVE    30 min Therapeutic Exercise:  [x] See flow sheet :   Rationale: increase ROM, increase strength and decrease pain to improve the patients ability to complete ADLs          With   [x] TE   [] TA   [] neuro   [] other: Patient Education: [x] Review HEP    [] Progressed/Changed HEP based on:   [] positioning   [] body mechanics   [] transfers   [] heat/ice application    [] other:      Other Objective/Functional Measures: NA     Pain Level (0-10 scale) post treatment: 1    ASSESSMENT/Changes in Function: Patient responds well to treatment session. Provided cues for activity pacing. Reviewed HEP and provided handout. No adverse effects were noted from today's treatment session. Patient will continue to benefit from skilled PT services to modify and progress therapeutic interventions, address functional mobility deficits, address ROM deficits, address strength deficits, analyze and address soft tissue restrictions, analyze and cue movement patterns, analyze and modify body mechanics/ergonomics, assess and modify postural abnormalities, instruct in home and community integration to attain remaining goals.       []  See Plan of Care  []  See progress note/recertification  []  See Discharge Summary         Progress towards goals / Updated goals:   Short Term Goals: To be accomplished in 4 weeks:                 Patient will report compliance with initial HEP 1x/day to aid in rehabilitation program.                 Status at IE: Provided initial HEP                 Current: In-progress, reviewed HEP and provided theraband 1/16/2020     Long Term Goals: To be accomplished in 8 weeks:                 Patient will increase B LE strength to 5/5 MMT throughout to aid in completion of ADLs. Status at IE:4-/5                 Current: Same as IE                    Patient will report pain no greater than 1-2/10 throughout entire day to aid in completion of ADLs. Status at IE:1-5/10                 Current:Same as IE                    Patent will be able to sit/stand 16 reps in 30 seconds to be able to perform ADLs and recreational activities. Status at IE:10                  Current: Same as IE                    Patient will improve FOTO score to 49 points to demonstrate improvement in functional status. Status at IE:FOTO score = 33 (an established functional score where 100 = no disability).                    Current: Same as IE    PLAN  []  Upgrade activities as tolerated     [x]  Continue plan of care  []  Update interventions per flow sheet       []  Discharge due to:_  []  Other:_      Fanta Laws, PT, DPT 1/16/2020  8:49 AM    Future Appointments   Date Time Provider Malka Ag   1/21/2020  8:30 AM Savannah Lee, PT [facility]   1/23/2020  8:30 AM Savannah Lee, PT [facility]   1/28/2020  8:30 AM Savannah Lee, PT [facility]   1/30/2020  8:30 AM Savannah Kulwinderuder, PT [facility]   2/4/2020  8:30 AM Savannah Rosa, PT [facility]   2/6/2020  8:30 AM Savannah Rosa, PT [facility]   2/11/2020  8:30 AM Savannah Lee, PT [facility]   2/13/2020  2:00 PM Savannah Lee, PT [facility]   2/18/2020  8:30 AM JASSI Lacy [facility]   2/20/2020  8:30 AM JASSI Lacy [facility]

## 2020-01-21 ENCOUNTER — HOSPITAL ENCOUNTER (OUTPATIENT)
Dept: PHYSICAL THERAPY | Age: 70
Discharge: HOME | End: 2020-01-21
Payer: MEDICARE

## 2020-01-21 PROCEDURE — 97110 THERAPEUTIC EXERCISES: CPT

## 2020-01-21 NOTE — PROGRESS NOTES
PT DAILY TREATMENT NOTE - [facility]     Patient Name: Tristan Montiel  Date:2020  : 1950  [x]  Patient  Verified  Payor: Debbie Silverio / Plan: [place] MEDICARE PART A & B / Product Type: Medicare /    In time:830  Out time:855  Total Treatment Time (min): 25  Total Timed Codes (min): 25   1:1 Treatment Time ( [address] only): 25   Visit #: 3 of 24    Treatment Area: Left hip pain [M25.552]  Low back pain [M54.5]    SUBJECTIVE  Pain Level (0-10 scale): 1  Any medication changes, allergies to medications, adverse drug reactions, diagnosis change, or new procedure performed?: [x] No    [] Yes (see summary sheet for update)  Subjective functional status/changes:   [] No changes reported    Patient reports that she was sore after last visit but expects to be. OBJECTIVE    25 min Therapeutic Exercise:  [x] See flow sheet :   Rationale: increase ROM, increase strength and decrease pain to improve the patients ability to complete ADLs          With   [x] TE   [] TA   [] neuro   [] other: Patient Education: [x] Review HEP    [] Progressed/Changed HEP based on:   [] positioning   [] body mechanics   [] transfers   [] heat/ice application    [] other:      Other Objective/Functional Measures: NA     Pain Level (0-10 scale) post treatment: 1     ASSESSMENT/Changes in Function: Patient responds well to treatment session. Patient required cues for activity pacing promoting muscle recovery periods. No adverse effects were noted from today's treatment session    Patient will continue to benefit from skilled PT services to modify and progress therapeutic interventions, address functional mobility deficits, address ROM deficits, address strength deficits, analyze and address soft tissue restrictions, analyze and cue movement patterns, analyze and modify body mechanics/ergonomics, assess and modify postural abnormalities,  instruct in home and community integration to attain remaining goals.       []  See Plan of Care  []  See progress note/recertification  []  See Discharge Summary         Progress towards goals / Updated goals:  Short Term Goals: To be accomplished in 4 weeks:                 RHGVUSI will report compliance with initial HEP 1x/day to aid in rehabilitation program.                 Status at IE: Provided initial HEP                 SJAVZLJ: In-progress, reviewed HEP and provided theraband 1/16/2020     Long Term Goals: To be accomplished in 8 weeks:                 UYNOIFP will increase B LE strength to 5/5 MMT throughout to aid in completion of ADLs.                Status at IE:4-/5                 Current: Same as IE                    Patient will report pain no greater than 1-2/10 throughout entire day to aid in completion of ADLs.                Status at IE:1-5/10                 Current: In-progress 1-4/10 with ADLs 1/21/2020                    Patent will be able to sit/stand 16 reps in 30 seconds to be able to perform ADLs and recreational activities.                Status at IE:10                  ANPLACT: Same as IE                    ASKSQ will improve FOTO score to 49 points to demonstrate improvement in functional status.                Status at IE:FOTO score = 33 (an established functional score where 100 = no disability).                    [id number]: Same as IE    PLAN  []  Upgrade activities as tolerated     [x]  Continue plan of care  []  Update interventions per flow sheet       []  Discharge due to:_  []  Other:_      Karmen Moreno, PT, DPT 1/21/2020  8:28 AM    Future Appointments   Date Time Provider Malka Ag   1/21/2020  8:30 AM Ernesto Carranza PT [facility]   1/23/2020  8:30 AM Ernesto Carranza PT [facility]   1/28/2020  8:30 AM Norejaiem Carranza PT [facility]   1/30/2020  8:30 AM Norejaime Carranza PT [facility]   2/4/2020  8:30 AM Norejaime Carranza, PT [facility]   2/6/2020  8:30 AM Ernesto Carranza, PT [facility]   2/11/2020  8:30 AM Ernesto Carranza, PT [facility] 2/13/2020  2:00 PM JASSI Garnica [facility]   2/18/2020  8:30 AM JASSI Garnica [facility]   2/20/2020  8:30 AM JASSI Garnica [facility]

## 2020-01-23 ENCOUNTER — HOSPITAL ENCOUNTER (OUTPATIENT)
Dept: PHYSICAL THERAPY | Age: 70
Discharge: HOME | End: 2020-01-23
Payer: MEDICARE

## 2020-01-23 PROCEDURE — 97110 THERAPEUTIC EXERCISES: CPT

## 2020-01-23 NOTE — PROGRESS NOTES
PT DAILY TREATMENT NOTE - [facility]     Patient Name: Marine Dodson  Date:2020  : 1950  [x]  Patient  Verified  Payor: Alanis Camara / Plan: [place] MED[place] & [place] / Product Type: Medicare /    In time:830  Out time:905  Total Treatment Time (min): 35  Total Timed Codes (min): 35   1:1 Treatment Time ( [address] only): 30   Visit #: 4 of 24    Treatment Area: Left hip pain [M25.552]  Low back pain [M54.5]    SUBJECTIVE  Pain Level (0-10 scale): 1  Any medication changes, allergies to medications, adverse drug reactions, diagnosis change, or new procedure performed?: [x] No    [] Yes (see summary sheet for update)  Subjective functional status/changes:   [] No changes reported    Patient reports that her low back is sore today. OBJECTIVE    30 min Therapeutic Exercise:  [x] See flow sheet :   Rationale: increase ROM, increase strength and decrease pain to improve the patients ability to complete ADLs          With   [x] TE   [] TA   [] neuro   [] other: Patient Education: [x] Review HEP    [] Progressed/Changed HEP based on:   [] positioning   [] body mechanics   [] transfers   [] heat/ice application    [] other:      Other Objective/Functional Measures: na     Pain Level (0-10 scale) post treatment: 1    ASSESSMENT/Changes in Function:     Patient responds well to treatment session. Patient demonstrated improved autonomy with exercise. Will progress exercise next visit. No adverse effects were noted from today's treatment session    Patient will continue to benefit from skilled PT services to modify and progress therapeutic interventions, address functional mobility deficits, address ROM deficits, address strength deficits, analyze and address soft tissue restrictions, analyze and cue movement patterns, analyze and modify body mechanics/ergonomics, assess and modify postural abnormalities,  instruct in home and community integration to attain remaining goals.       []  See Plan of Care  []  See progress note/recertification  []  See Discharge Summary         Progress towards goals / Updated goals:  Short Term Goals: To be accomplished in 4 weeks:                 KWOHBVB will report compliance with initial HEP 1x/day to aid in rehabilitation program.                 Status at IE: Provided initial HEP                 Current: In-progress, reviewed HEP and provided theraband 1/16/2020     Long Term Goals: To be accomplished in 8 weeks:                 ANSYDGV will increase B LE strength to 5/5 MMT throughout to aid in completion of ADLs.                Status at IE:4-/5                 Current: Same as IE                    Patient will report pain no greater than 1-2/10 throughout entire day to aid in completion of ADLs.                Status at IE:1-5/10                 Current: In-progress 1-4/10 with ADLs 1/21/2020                    Patent will be able to sit/stand 16 reps in 30 seconds to be able to perform ADLs and recreational activities.                Status at IE:10                  JLJPEQP: Same as IE                    NWYOUIE will improve FOTO score to 49 points to demonstrate improvement in functional status.                Status at IE:FOTO score = 33 (an established functional score where 100 = no disability).                    LEFKHIF: Same as IE    PLAN  []  Upgrade activities as tolerated     [x]  Continue plan of care  []  Update interventions per flow sheet       []  Discharge due to:_  []  Other:_      Meme Aguilera PT, DPT 1/23/2020  8:19 AM    Future Appointments   Date Time Provider Malka Ag   1/23/2020  8:30 AM Candie Mancini PT [facility]   1/28/2020  8:30 AM [facility]   1/30/2020  8:30 AM Candie Mancini PT [facility]   2/4/2020  8:30 AM Candie Mancini, PT [facility]   2/6/2020  8:30 AM Candie Mancini, PT [facility]   2/11/2020  8:30 AM Candie Mancini, PT [facility]   2/13/2020  2:00 PM Candie Mancini, PT [facility]   2/18/2020 8:30 AM JASSI Gillis [facility]   2/20/2020  8:30 AM JASSI Gillis [facility]

## 2020-01-28 ENCOUNTER — HOSPITAL ENCOUNTER (OUTPATIENT)
Dept: PHYSICAL THERAPY | Age: 70
Discharge: HOME | End: 2020-01-28
Payer: MEDICARE

## 2020-01-28 PROCEDURE — 97110 THERAPEUTIC EXERCISES: CPT

## 2020-01-28 NOTE — PROGRESS NOTES
PT DAILY TREATMENT NOTE - [facility]     Patient Name: Mary Ramirez  Date:2020  : 1950  [x]  Patient  Verified  Payor: [place] MEDICARE / Plan: [place] MEDICARE PART A & B / Product Type: Medicare /    In IDXM:[id number]  Out time:905  Total Treatment Time (min): 40  Total Timed Codes (min): 40   1:1 Treatment Time ( [address] only): 40   Visit #: 5 of 24    Treatment Area: Left hip pain [M25.552]  Low back pain [M54.5]    SUBJECTIVE  Pain Level (0-10 scale): 1  Any medication changes, allergies to medications, adverse drug reactions, diagnosis change, or new procedure performed?: [x] No    [] Yes (see summary sheet for update)  Subjective functional status/changes:   [] No changes reported  Patient reports no new changes today. She states overall she feels good today. OBJECTIVE    40 min Therapeutic Exercise:  [x] See flow sheet :   Rationale: increase ROM, increase strength and decrease pain to improve the patients ability to complete ADLs          With   [x] TE   [] TA   [] neuro   [] other: Patient Education: [x] Review HEP    [] Progressed/Changed HEP based on:   [] positioning   [] body mechanics   [] transfers   [] heat/ice application    [] other:      Other Objective/Functional Measures: NA     Pain Level (0-10 scale) post treatment: 1    ASSESSMENT/Changes in Function: Patient responds well to treatment session. Patient demonstrated improved autonomy with theraband activities progressed intensity by increasing resistance.  Will progress exercise by introducing dead lift and single arm rows next visit, No adverse effects were noted from today's treatment session      Patient will continue to benefit from skilled PT services to modify and progress therapeutic interventions, address functional mobility deficits, address ROM deficits, address strength deficits, analyze and address soft tissue restrictions, analyze and cue movement patterns, analyze and modify body mechanics/ergonomics, assess and modify postural abnormalities, instruct in home and community integration to attain remaining goals. []  See Plan of Care  []  See progress note/recertification  []  See Discharge Summary         Progress towards goals / Updated goals:  Short Term Goals: To be accomplished in 4 weeks:                 TRACY will report compliance with initial HEP 1x/day to aid in rehabilitation program.                 Status at IE: Provided initial HEP                 Current: In-progress, reviewed HEP and provided theraband 1/16/2020     Long Term Goals: To be accomplished in 8 weeks:                 Patient will increase B LE strength to 5/5 MMT throughout to aid in completion of ADLs.                Status at IE:4-/5                 Current: Same as IE                    Patient will report pain no greater than 1-2/10 throughout entire day to aid in completion of ADLs.                Status at IE:1-5/10                 Current: In-progress 1-3/10 with ADLs 1/28/2020                    Patent will be able to sit/stand 16 reps in 30 seconds to be able to perform ADLs and recreational activities.                Status at IE:10                  JLHWETE: Same as IE                     UDUIQRE will improve FOTO score to 49 points to demonstrate improvement in functional status.                Status at IE:FOTO score = 33 (an established functional score where 100 = no disability).                    PTOQIOA: Same as IE    PLAN  []  Upgrade activities as tolerated     [x]  Continue plan of care  []  Update interventions per flow sheet       []  Discharge due to:_  []  Other:_      Jade Mandujano, PT, DPT 1/28/2020  8:40 AM    Future Appointments   Date Time Provider Malka Ag   1/30/2020  3:00 PM Zak Del Cid PT, DPT [facility]   2/4/2020  8:30 AM Mejia Argueta PT [facility]   2/6/2020  8:30 AM Zak Del Cid PT, DPT [facility]   2/11/2020  8:30 AM Mejia Argueta PT [facility]   2/13/2020  2:00 PM Mejia Argueta PT [facility]   2/18/2020  8:30 AM JASSI Watson [facility]   2/20/2020  8:30 AM JASSI Watson THE [facility]

## 2020-01-30 ENCOUNTER — HOSPITAL ENCOUNTER (OUTPATIENT)
Dept: PHYSICAL THERAPY | Age: 70
Discharge: HOME | End: 2020-01-30
Payer: MEDICARE

## 2020-01-30 PROCEDURE — 97110 THERAPEUTIC EXERCISES: CPT | Performed by: PHYSICAL THERAPIST

## 2020-01-30 NOTE — PROGRESS NOTES
PT DAILY TREATMENT NOTE    Patient Name: Antonia Mcghee  Date:2020  : 1950  [x]  Patient  Verified  Payor: Enid Verma / Plan: [place] MEDICARE PART A & B / Product Type: Medicare /    In time:3:00  Out time:3:38  Total Treatment Time (min): 38  Total Timed Codes (min): 38  1:1 Treatment Time ( [address] only): 25   Visit #: 6 of 24    Treatment Area: Left hip pain [M25.552]  Low back pain [M54.5]    SUBJECTIVE  Pain Level (0-10 scale): 0  Any medication changes, allergies to medications, adverse drug reactions, diagnosis change, or new procedure performed?: [x] No    [] Yes (see summary sheet for update)  Subjective functional status/changes:   [] No changes reported  Patient reports feeling better overall. The exercises haven't hurt and the pain is reducing     OBJECTIVE    25 min Therapeutic Exercise:  [x] See flow sheet :   Rationale: increase ROM, increase strength and decrease pain to improve the patients ability to complete ADLs         With   [] TE   [] TA   [] neuro   [] other: Patient Education: [x] Review HEP    [] Progressed/Changed HEP based on:   [] positioning   [] body mechanics   [] transfers   [] heat/ice application    [] other:      Other Objective/Functional Measures: NA     Pain Level (0-10 scale) post treatment: 0    ASSESSMENT/Changes in Function: Patient responds well to treatment session. Patient is progressing well. Minimal difficulty with exercises as prescribed. Progress as tolerated.  No adverse effects were noted from today's treatment session       Patient will continue to benefit from skilled PT services to modify and progress therapeutic interventions, address functional mobility deficits, address ROM deficits, address strength deficits, analyze and address soft tissue restrictions, analyze and cue movement patterns, analyze and modify body mechanics/ergonomics, assess and modify postural abnormalitie    []  See Plan of Care  []  See progress note/recertification  []  See Discharge Summary         Progress towards goals / Updated goals:  Short Term Goals: To be accomplished in 4 weeks:                 JENNIFER will report compliance with initial HEP 1x/day to aid in rehabilitation program.                 Status at IE: Provided initial HEP                 Current: In-progress, reviewed HEP and provided theraband 1/16/2020     Long Term Goals: To be accomplished in 8 weeks:                 RTHORNX will increase B LE strength to 5/5 MMT throughout to aid in completion of ADLs.                Status at IE:4-/5                 Current: Same as IE                    Patient will report pain no greater than 1-2/10 throughout entire day to aid in completion of ADLs.                Status at IE:1-5/10                 Current: In-progress 1-3/10 with ADLs 1/28/2020                    Patent will be able to sit/stand 16 reps in 30 seconds to be able to perform ADLs and recreational activities.                Status at IE:10                  JLBUUFB: Same as IE                     HFIUCKI will improve FOTO score to 49 points to demonstrate improvement in functional status.                Status at IE:FOTO score = 33 (an established functional score where 100 = no disability).                    VAWEHYG: Same as IE       PLAN  []  Upgrade activities as tolerated     [x]  Continue plan of care  []  Update interventions per flow sheet       []  Discharge due to:_  []  Other:_      Pepe Villareal PT, DPT 1/30/2020  3:10 PM    Future Appointments   Date Time Provider Malka Ag   2/4/2020  8:30 AM Jose Rouse [facility]   2/6/2020  8:30 AM Sabino Oppenheim, PT, DPT [facility]   2/11/2020  8:30 AM Negar Chavez, PT [facility]   2/13/2020  2:00 PM Negar Chavez, PT [facility]   2/18/2020  8:30 AM Negar Chavez, PT [facility]   2/20/2020  8:30 AM Negar Chavez, PT [facility]

## 2020-02-04 ENCOUNTER — HOSPITAL ENCOUNTER (OUTPATIENT)
Dept: PHYSICAL THERAPY | Age: 70
Discharge: HOME | End: 2020-02-04
Payer: MEDICARE

## 2020-02-04 PROCEDURE — 97110 THERAPEUTIC EXERCISES: CPT

## 2020-02-04 NOTE — PROGRESS NOTES
PT DAILY TREATMENT NOTE - [facility]     Patient Name: Christos Vidal  Date:2020  : 1950  [x]  Patient  Verified  Payor: Neftali Story / Plan: [place] MEDICARE PART A & B / Product Type: Medicare /    In time:830  Out time:913  Total Treatment Time (min): 43  Total Timed Codes (min): 43   1:1 Treatment Time ( [address] only): 37   Visit #: 7 of 24    Treatment Area: Left hip pain [M25.552]  Low back pain [M54.5]    SUBJECTIVE  Pain Level (0-10 scale): 1  Any medication changes, allergies to medications, adverse drug reactions, diagnosis change, or new procedure performed?: [x] No    [] Yes (see summary sheet for update)  Subjective functional status/changes:   [] No changes reported    Patient reports that her back is sore as she has been less active secondary to fighting an illness. OBJECTIVE    43 min Therapeutic Exercise:  [x] See flow sheet :   Rationale: increase ROM, increase strength and decrease pain to improve the patients ability to complete ADLs          With   [x] TE   [] TA   [] neuro   [] other: Patient Education: [x] Review HEP    [] Progressed/Changed HEP based on:   [] positioning   [] body mechanics   [] transfers   [] heat/ice application    [] other:      Other Objective/Functional Measures: NA     Pain Level (0-10 scale) post treatment: 0    ASSESSMENT/Changes in Function: Patient responds well to treatment session. Patient required cues to reduce UEA with toe taps and step activities. Advanced exercise by introducing activities that promote rotational stability. She was challenged with exercises prescribed. No adverse effects were noted from today's treatment session.       Patient will continue to benefit from skilled PT services to modify and progress therapeutic interventions, address functional mobility deficits, address ROM deficits, address strength deficits, analyze and address soft tissue restrictions, analyze and cue movement patterns, analyze and modify body mechanics/ergonomics, assess and modify postural abnormalities, instruct in home and community integration to attain remaining goals. []  See Plan of Care  []  See progress note/recertification  []  See Discharge Summary         Progress towards goals / Updated goals:  Short Term Goals: To be accomplished in 4 weeks:                 VTDDIRJ will report compliance with initial HEP 1x/day to aid in rehabilitation program.                 Status at IE: Provided initial HEP                 Current:  Met 2/4/2020     Long Term Goals: To be accomplished in 8 weeks:                 Patient will increase B LE strength to 5/5 MMT throughout to aid in completion of ADLs.                Status at IE:4-/5                 Current: Same as IE                    Patient will report pain no greater than 1-2/10 throughout entire day to aid in completion of ADLs.                Status at IE:1-5/10                 Current: In-progress 1-3/10 with ADLs 1/28/2020                    Patent will be able to sit/stand 16 reps in 30 seconds to be able to perform ADLs and recreational activities.                Status at IE:10                  EGDYFDR: Same as IE                     OXCGTDM will improve FOTO score to 49 points to demonstrate improvement in functional status.                Status at IE:FOTO score = 33 (an established functional score where 100 = no disability).                    MEPFOEF: Same as IE     PLAN  []  Upgrade activities as tolerated     [x]  Continue plan of care  []  Update interventions per flow sheet       []  Discharge due to:_  []  Other:_      Jose Faria, PT, DPT 2/4/2020  8:29 AM    Future Appointments   Date Time Provider Malka Ag   2/4/2020  8:30 AM Izabel Smith PT [facility]   2/6/2020  8:30 AM Joshua Church PT, DPT [facility]   2/11/2020  8:30 AM Izabel Smith PT [facility]   2/13/2020  2:00 PM Izabel Smith PT [facility]   2/18/2020  8:30 AM Izabel Smith PT [facility]   2/20/2020 8:30 AM JASSI Jasso [facility]

## 2020-02-06 ENCOUNTER — HOSPITAL ENCOUNTER (OUTPATIENT)
Dept: PHYSICAL THERAPY | Age: 70
Discharge: HOME | End: 2020-02-06
Payer: MEDICARE

## 2020-02-06 PROCEDURE — 97110 THERAPEUTIC EXERCISES: CPT | Performed by: PHYSICAL THERAPIST

## 2020-02-06 NOTE — PROGRESS NOTES
PT DAILY TREATMENT NOTE    Patient Name: Josseline Tierney  Date:2020  : 1950  [x]  Patient  Verified  Payor: Johanny Breath / Plan: [place] MEDICARE PART A & B / Product Type: Medicare /    In time:8:30  Out time:9:13  Total Treatment Time (min): 43  Total Timed Codes (min): 43  1:1 Treatment Time ( [address] only): 30   Visit #: 8 of 24    Treatment Area: Left hip pain [M25.552]  Low back pain [M54.5]    SUBJECTIVE  Pain Level (0-10 scale): 0  Any medication changes, allergies to medications, adverse drug reactions, diagnosis change, or new procedure performed?: [x] No    [] Yes (see summary sheet for update)  Subjective functional status/changes:   [] No changes reported  Feels alright. No new issues. OBJECTIVE    43 min Therapeutic Exercise:  [x] See flow sheet :   Rationale: increase ROM, increase strength and decrease pain to improve the patients ability to complete ADLs         With   [] TE   [] TA   [] neuro   [] other: Patient Education: [x] Review HEP    [] Progressed/Changed HEP based on:   [] positioning   [] body mechanics   [] transfers   [] heat/ice application    [] other:      Other Objective/Functional Measures: NA     Pain Level (0-10 scale) post treatment: 0    ASSESSMENT/Changes in Function: Patient responds well to treatment session. Patient is progressing well. Only minimla difficulty with sidelying hip abduction. Will progress as tolerated.  No adverse effects were noted from today's treatment session       Patient will continue to benefit from skilled PT services to modify and progress therapeutic interventions, address functional mobility deficits, address ROM deficits, address strength deficits, analyze and address soft tissue restrictions, analyze and cue movement patterns, analyze and modify body mechanics/ergonomics, assess and modify postural abnormalities    []  See Plan of Care  []  See progress note/recertification  []  See Discharge Summary         Progress towards goals / Updated goals:  Short Term Goals: To be accomplished in 4 weeks:                 DLCYSLJ will report compliance with initial HEP 1x/day to aid in rehabilitation program.                 Status at IE: Provided initial HEP                 Current:  Met 2/4/2020     Long Term Goals: To be accomplished in 8 weeks:                 Patient will increase B LE strength to 5/5 MMT throughout to aid in completion of ADLs.                Status at IE:4-/5                 Current: Same as IE                    Patient will report pain no greater than 1-2/10 throughout entire day to aid in completion of ADLs.                Status at IE:1-5/10                 Current: In-progress 1-3/10 with ADLs 1/28/2020                    Patent will be able to sit/stand 16 reps in 30 seconds to be able to perform ADLs and recreational activities.                Status at IE:10                  SHPSQHC: Same as IE                     DRKISPI will improve FOTO score to 49 points to demonstrate improvement in functional status.                Status at IE:FOTO score = 33 (an established functional score where 100 = no disability).                    THYRPAD: Same as IE    PLAN  []  Upgrade activities as tolerated     [x]  Continue plan of care  []  Update interventions per flow sheet       []  Discharge due to:_  []  Other:_      Carl Goodrich PT, DPT 2/6/2020  8:57 AM    Future Appointments   Date Time Provider Malka Ag   2/11/2020  8:30 AM JASSI Ramírez THE [facility]   2/13/2020  2:00 PM JASSI Ramírez THE [facility]   2/18/2020  8:30 AM JASSI Ramírez THE [facility]   2/20/2020  8:30 AM JASSI Ramírez THE [facility]

## 2020-02-11 ENCOUNTER — HOSPITAL ENCOUNTER (OUTPATIENT)
Dept: PHYSICAL THERAPY | Age: 70
Discharge: HOME | End: 2020-02-11
Payer: MEDICARE

## 2020-02-11 PROCEDURE — 97110 THERAPEUTIC EXERCISES: CPT

## 2020-02-11 NOTE — PROGRESS NOTES
PT DAILY TREATMENT NOTE - [facility]     Patient Name: Allie Ortega  Date:2020  : 1950  [x]  Patient  Verified  Payor: Marybeth Cota / Plan: [place] MEDICARE PART A & B / Product Type: Medicare /    In time:830  Out time:913  Total Treatment Time (min): 43  Total Timed Codes (min): 43   1:1 Treatment Time ( [address] only): 37   Visit #: 9 of 24    Treatment Area: Left hip pain [M25.552]  Low back pain [M54.5]    SUBJECTIVE  Pain Level (0-10 scale): 1  Any medication changes, allergies to medications, adverse drug reactions, diagnosis change, or new procedure performed?: [x] No    [] Yes (see summary sheet for update)  Subjective functional status/changes:   [] No changes reported    Patient reports that she is doing well but is fighting a respiratory infection. OBJECTIVE    43 min Therapeutic Exercise:  [x] See flow sheet :   Rationale: increase ROM, increase strength and decrease pain to improve the patients ability to complete ADLs          With   [x] TE   [] TA   [] neuro   [] other: Patient Education: [x] Review HEP    [] Progressed/Changed HEP based on:   [] positioning   [] body mechanics   [] transfers   [] heat/ice application    [] other:      Other Objective/Functional Measures: NA     Pain Level (0-10 scale) post treatment: 1    ASSESSMENT/Changes in Function: Patient responds well to treatment session. Patient demonstrated improved tolerance to exercise. Will reassess patient next visit and advance exercise as appropriate.  No adverse effects were noted from today's treatment session    Patient will continue to benefit from skilled PT services to modify and progress therapeutic interventions, address functional mobility deficits, address ROM deficits, address strength deficits, analyze and address soft tissue restrictions, analyze and cue movement patterns, analyze and modify body mechanics/ergonomics, assess and modify postural abnormalities, instruct in home and community integration to attain remaining goals. []  See Plan of Care  []  See progress note/recertification  []  See Discharge Summary         Progress towards goals / Updated goals:  Short Term Goals: To be accomplished in 4 weeks:                 FQAECAD will report compliance with initial HEP 1x/day to aid in rehabilitation program.                 Status at IE: Provided initial HEP                 Current:  Met 2/4/2020     Long Term Goals: To be accomplished in 8 weeks:                 Patient will increase B LE strength to 5/5 MMT throughout to aid in completion of ADLs.                Status at IE:4-/5                 Current: In-progress 4/5 2/11/2020                    Patient will report pain no greater than 1-2/10 throughout entire day to aid in completion of ADLs.                Status at IE:1-5/10                 Current: In-progress 1-3/10 with ADLs 1/28/2020                    Patent will be able to sit/stand 16 reps in 30 seconds to be able to perform ADLs and recreational activities.                Status at IE:10                  LTGHNZJ: Same as IE                     TYLDFTB will improve FOTO score to 49 points to demonstrate improvement in functional status.                Status at IE:FOTO score = 33 (an established functional score where 100 = no disability).                    CALIIFQ: Same as IE    PLAN  []  Upgrade activities as tolerated     [x]  Continue plan of care  []  Update interventions per flow sheet       []  Discharge due to:_  []  Other:_      Jeanna Thompson, PT, DPT 2/11/2020  8:45 AM    Future Appointments   Date Time Provider Malka Ag   2/13/2020  2:00 PM JASSI Ramírez [facility]   2/18/2020  8:30 AM JASSI Ramírez [facility]   2/20/2020  8:30 AM JASSI Ramírez THE [facility]

## 2020-02-13 ENCOUNTER — HOSPITAL ENCOUNTER (OUTPATIENT)
Dept: PHYSICAL THERAPY | Age: 70
Discharge: HOME | End: 2020-02-13
Payer: MEDICARE

## 2020-02-13 PROCEDURE — 97110 THERAPEUTIC EXERCISES: CPT

## 2020-02-13 NOTE — PROGRESS NOTES
PT DAILY TREATMENT NOTE - [facility]     Patient Name: Paula Hodgson  Date:2020  : 1950  [x]  Patient  Verified  Payor: Adrian Aaron / Plan: [place] / Product Type: Medicare /    In time:200  Out time:240  Total Treatment Time (min): 40  Total Timed Codes (min): 40   1:1 Treatment Time ( [address] only): 30   Visit #: 10 of 24    Treatment Area: Left hip pain [M25.552]  Low back pain [M54.5]    SUBJECTIVE  Pain Level (0-10 scale): 0  Any medication changes, allergies to medications, adverse drug reactions, diagnosis change, or new procedure performed?: [x] No    [] Yes (see summary sheet for update)  Subjective functional status/changes:   [] No changes reported  Patient reports that she has no pain and that she is doing well. OBJECTIVE    30 min Therapeutic Exercise:  [x] See flow sheet :   Rationale: increase ROM, increase strength and decrease pain to improve the patients ability to complete ADLs          With   [x] TE   [] TA   [] neuro   [] other: Patient Education: [x] Review HEP    [] Progressed/Changed HEP based on:   [] positioning   [] body mechanics   [] transfers   [] heat/ice application    [] other:      Other Objective/Functional Measures:   FOTO 69  MMT 5/5  30 second sit to stand 19 repetitions  Pain 0-2/10 with ADLs     Pain Level (0-10 scale) post treatment: 0    ASSESSMENT/Changes in Function: Patient responds well to treatment session. No adverse effects were noted from today's treatment session. Patient is being discharged as she has met 100% of her short and long term goals. She has gained strength and functional mobility resulting in reduction of symptoms. She has increased activity tolerance. She has developed independence with exercise and plans to participate in an independent exercise program to reduce future episodes of care. Provided advanced HEP and theraband to promote seamless transition to independent fitness.         []  See Plan of Care  []  See progress note/recertification  []  See Discharge Summary         Progress towards goals / Updated goals:  Short Term Goals: To be accomplished in 4 weeks:                 FKUOYFI will report compliance with initial HEP 1x/day to aid in rehabilitation program.                 Status at IE: Provided initial HEP                 Current:  Met 2/4/2020     Long Term Goals: To be accomplished in 8 weeks:                 Patient will increase B LE strength to 5/5 MMT throughout to aid in completion of ADLs.                Status at IE:4-/5                 Current:  Met 5/5 2/13/2020                    Patient will report pain no greater than 1-2/10 throughout entire day to aid in completion of ADLs.                Status at IE:1-5/10                 Current: Met 0-2/10 with ADLs 2/13/2020                    Patent will be able to sit/stand 16 reps in 30 seconds to be able to perform ADLs and recreational activities.                Status at IE:10                  FFZOCWX:  Met 19 reps 2/13/2020                    Patient will improve FOTO score to 49 points to demonstrate improvement in functional status.                Status at IE:FOTO score = 33 (an established functional score where 100 = no disability).                    TVCDLMK:  Met 69 2/13/2020  PLAN  []  Upgrade activities as tolerated     [x]  Continue plan of care  []  Update interventions per flow sheet       []  Discharge due to:_  []  Other:_      Cari Solitario, PT, DPT 2/13/2020  2:13 PM    Future Appointments   Date Time Provider Malka Ag   2/18/2020  8:30 AM JASSI Leyva [facility]   2/20/2020  8:30 AM JASSI Leyva THE [facility]

## 2020-02-13 NOTE — PROGRESS NOTES
[facility] at [address] [place] [address] Drive: [phone]   Fax: [phone]  Discharge Summary  Patient Name: Terrell Hidalgo : 1950   Medical   Diagnosis: Left hip pain [M25.552]  Low back pain [M54.5] Treatment Diagnosis: Left Hip Pain  Low Back Pain   Onset Date: Chronic     Referral Source: Amanda Kelly MD Start of Care [facility]): 2020   Prior Hospitalization: See medical history Provider #: [id number]   Prior Level of Function: Gardening, Aquatic Aerobics   Comorbidities: OA   Medications: Verified on Patient Summary List      ===========================================================================================  Assessment / Summary of Care:  Terrell Hidalgo is a 81 y.o.  female with c/o left hip pain, low back pain, and left knee pain and stiffness for the last 2 years with no known mechanism of injury. Patient is being discharged as she has met 100% of her short and long term goals. She has gained strength and functional mobility resulting in reduction of symptoms. She has increased activity tolerance. She has developed independence with exercise and plans to participate in an independent exercise program to reduce future episodes of care. Provided advanced HEP and theraband to promote seamless transition to independent fitness.       ===========================================================================================    Plan: Discharge to independent HEP. Goals:   Short Term Goals: To be accomplished in 4 weeks:                 HYDFP will report compliance with initial HEP 1x/day to aid in rehabilitation program.                 Status at IE: Provided initial HEP                 Current:  Met 2020     Long Term Goals: To be accomplished in 8 weeks:                 Patient will increase B LE strength to 5/5 MMT throughout to aid in completion of ADLs.                  Status at IE:4-/                 Current:  Met  2/13/2020                    Patient will report pain no greater than 1-2/10 throughout entire day to aid in completion of ADLs.                Status at IE:1-5/10                 Current: Met 0-2/10 with ADLs 2/13/2020                    Patent will be able to sit/stand 16 reps in 30 seconds to be able to perform ADLs and recreational activities.                Status at IE:10                  UFHCJSR:  Met 19 reps 2/13/2020                    Patient will improve FOTO score to 49 points to demonstrate improvement in functional status.                Status at IE:FOTO score = 33 (an established functional score where 100 = no disability).                    DTIBYCJ:  Met 69 2/13/2020    ===========================================================================================  Subjective: Patient reports that she has no pain and that she is doing well.      Objective:   FOTO 69  MMT 5/5  30 second sit to stand 19 repetitions  Pain 0-2/10 with ADLs         Therapist Signature: Berto Luther PT, DPT Date: 2/13/2020     Time: 3:33 PM

## 2020-02-18 ENCOUNTER — APPOINTMENT (OUTPATIENT)
Dept: PHYSICAL THERAPY | Age: 70
End: 2020-02-18
Payer: MEDICARE

## 2020-02-20 ENCOUNTER — APPOINTMENT (OUTPATIENT)
Dept: PHYSICAL THERAPY | Age: 70
End: 2020-02-20
Payer: MEDICARE

## 2021-04-22 ENCOUNTER — HOSPITAL ENCOUNTER (OUTPATIENT)
Dept: LAB | Age: 71
Discharge: HOME | End: 2021-04-22
Payer: MEDICARE

## 2021-04-22 ENCOUNTER — HOSPITAL ENCOUNTER (OUTPATIENT)
Dept: NON INVASIVE DIAGNOSTICS | Age: 71
Discharge: HOME | End: 2021-04-22
Payer: MEDICARE

## 2021-04-22 ENCOUNTER — TRANSCRIBE ORDER (OUTPATIENT)
Dept: REGISTRATION | Age: 71
End: 2021-04-22

## 2021-04-22 DIAGNOSIS — C44.01 BCC (BASAL CELL CARCINOMA), LIP: Primary | ICD-10-CM

## 2021-04-22 DIAGNOSIS — C44.01 BCC (BASAL CELL CARCINOMA), LIP: ICD-10-CM

## 2021-04-22 DIAGNOSIS — Z01.812 BLOOD TESTS PRIOR TO TREATMENT OR PROCEDURE: ICD-10-CM

## 2021-04-22 LAB
ATRIAL RATE: 86 BPM
CALCULATED P AXIS, ECG09: 78 DEGREES
CALCULATED R AXIS, ECG10: 48 DEGREES
CALCULATED T AXIS, ECG11: 70 DEGREES
DIAGNOSIS, 93000: NORMAL
HCT VFR BLD AUTO: 39.9 % (ref 35–45)
HGB BLD-MCNC: 12.3 G/DL (ref 12–16)
P-R INTERVAL, ECG05: 160 MS
POTASSIUM SERPL-SCNC: 3.7 MMOL/L (ref 3.5–5.5)
Q-T INTERVAL, ECG07: 370 MS
QRS DURATION, ECG06: 90 MS
QTC CALCULATION (BEZET), ECG08: 442 MS
VENTRICULAR RATE, ECG03: 86 BPM

## 2021-04-22 PROCEDURE — 85018 HEMOGLOBIN: CPT

## 2021-04-22 PROCEDURE — 93005 ELECTROCARDIOGRAM TRACING: CPT

## 2021-04-22 PROCEDURE — 36415 COLL VENOUS BLD VENIPUNCTURE: CPT

## 2021-04-22 PROCEDURE — 84132 ASSAY OF SERUM POTASSIUM: CPT

## (undated) DEVICE — DEVON™ KNEE AND BODY STRAP 60" X 3" (1.5 M X 7.6 CM): Brand: DEVON

## (undated) DEVICE — KENDALL SCD EXPRESS SLEEVES, KNEE LENGTH, MEDIUM: Brand: KENDALL SCD

## (undated) DEVICE — Device

## (undated) DEVICE — CANN VISCOFLOW FRM 27G 22MM --

## (undated) DEVICE — 3M™ TEGADERM™ I.V. SECUREMENT DRESSING, 9525HP, 2-1/2 IN X 2-3/4 IN (6.5 CM X 7 CM), 100/CT 4CT/CASE: Brand: 3M™ TEGADERM™

## (undated) DEVICE — MICROSURGICAL INSTRUMENT HYDRODISSECTION CANNULA 27GA, 1.57MM BEND (AKAHOSHI STYLE): Brand: ALCON